# Patient Record
Sex: FEMALE | Race: WHITE | HISPANIC OR LATINO | Employment: FULL TIME | URBAN - METROPOLITAN AREA
[De-identification: names, ages, dates, MRNs, and addresses within clinical notes are randomized per-mention and may not be internally consistent; named-entity substitution may affect disease eponyms.]

---

## 2024-04-18 ENCOUNTER — HOSPITAL ENCOUNTER (EMERGENCY)
Facility: HOSPITAL | Age: 24
Discharge: HOME | End: 2024-04-18
Attending: EMERGENCY MEDICINE
Payer: MEDICAID

## 2024-04-18 ENCOUNTER — APPOINTMENT (OUTPATIENT)
Dept: GENERAL RADIOLOGY | Facility: HOSPITAL | Age: 24
End: 2024-04-18
Payer: MEDICAID

## 2024-04-18 VITALS
BODY MASS INDEX: 25.9 KG/M2 | TEMPERATURE: 98 F | RESPIRATION RATE: 18 BRPM | SYSTOLIC BLOOD PRESSURE: 124 MMHG | HEART RATE: 72 BPM | DIASTOLIC BLOOD PRESSURE: 87 MMHG | OXYGEN SATURATION: 98 % | WEIGHT: 165 LBS | HEIGHT: 67 IN

## 2024-04-18 DIAGNOSIS — M25.552 PAIN OF LEFT HIP: ICD-10-CM

## 2024-04-18 DIAGNOSIS — M54.50 ACUTE LOW BACK PAIN, UNSPECIFIED BACK PAIN LATERALITY, UNSPECIFIED WHETHER SCIATICA PRESENT: Primary | ICD-10-CM

## 2024-04-18 DIAGNOSIS — S70.02XA CONTUSION OF LEFT HIP, INITIAL ENCOUNTER: ICD-10-CM

## 2024-04-18 DIAGNOSIS — S20.222A CONTUSION OF LEFT SIDE OF BACK, INITIAL ENCOUNTER: ICD-10-CM

## 2024-04-18 PROCEDURE — 99283 EMERGENCY DEPT VISIT LOW MDM: CPT

## 2024-04-18 PROCEDURE — 73502 X-RAY EXAM HIP UNI 2-3 VIEWS: CPT

## 2024-04-18 PROCEDURE — 72100 X-RAY EXAM L-S SPINE 2/3 VWS: CPT

## 2024-04-18 PROCEDURE — 25010000002 KETOROLAC TROMETHAMINE PER 15 MG: Performed by: NURSE PRACTITIONER

## 2024-04-18 PROCEDURE — 96372 THER/PROPH/DIAG INJ SC/IM: CPT

## 2024-04-18 RX ORDER — IBUPROFEN 600 MG/1
600 TABLET ORAL EVERY 6 HOURS PRN
Qty: 20 TABLET | Refills: 0 | Status: SHIPPED | OUTPATIENT
Start: 2024-04-18 | End: 2024-04-23

## 2024-04-18 RX ORDER — KETOROLAC TROMETHAMINE 30 MG/ML
30 INJECTION, SOLUTION INTRAMUSCULAR; INTRAVENOUS ONCE
Status: COMPLETED | OUTPATIENT
Start: 2024-04-18 | End: 2024-04-18

## 2024-04-18 RX ORDER — METHOCARBAMOL 750 MG/1
750 TABLET, FILM COATED ORAL 3 TIMES DAILY PRN
Qty: 15 TABLET | Refills: 0 | Status: SHIPPED | OUTPATIENT
Start: 2024-04-18 | End: 2024-04-23

## 2024-04-18 RX ORDER — HYDROCODONE BITARTRATE AND ACETAMINOPHEN 5; 325 MG/1; MG/1
1 TABLET ORAL ONCE
Status: COMPLETED | OUTPATIENT
Start: 2024-04-18 | End: 2024-04-18

## 2024-04-18 RX ADMIN — KETOROLAC TROMETHAMINE 30 MG: 30 INJECTION, SOLUTION INTRAMUSCULAR; INTRAVENOUS at 19:11

## 2024-04-18 RX ADMIN — HYDROCODONE BITARTRATE AND ACETAMINOPHEN 1 TABLET: 5; 325 TABLET ORAL at 19:11

## 2024-04-18 NOTE — Clinical Note
[facility] EMERGENCY DEPARTMENT  [address]  [facility] [address]  Phone: [phone]    Gita Vargas was seen and treated in our emergency department on 4/18/2024.  She may return to work on 04/20/2024.         Thank you for choosing [facility].    Lucía Camara, ODESSA

## 2024-04-18 NOTE — ED PROVIDER NOTES
EMERGENCY DEPARTMENT ENCOUNTER    Pt Name: Gita Vargas  MRN: [id number]  Pt :   2000  Room Number:  RW6/R6  Date of encounter:  2024  PCP: Provider, No Known  ED Provider: ODESSA Squires    Historian: Patient    HPI:  Chief Complaint:  Lt hip pain    Context: Gita Vargas is a 24 y.o. female who presents to the ED c/o Lt hip pain.  Patient explains that she tripped over her acute fracture 4 days ago.  Patient landed on her left hip.  She reports that she has been experiencing pain in her left hip, low back.  She describes it as a twinge discomfort.  She complains of pain with all movement.  HPI     REVIEW OF SYSTEMS  A chief complaint appropriate review of systems was completed and is negative except as noted in the HPI.     PAST MEDICAL HISTORY  No past medical history on file.    PAST SURGICAL HISTORY  No past surgical history on file.    FAMILY HISTORY  No family history on file.    SOCIAL HISTORY  Social History     Socioeconomic History    Marital status: Single       ALLERGIES  Patient has no known allergies.    PHYSICAL EXAM  Physical Exam  Vitals and nursing note reviewed.   Constitutional:       General: She is not in acute distress.     Appearance: Normal appearance. She is not ill-appearing.   HENT:      Head: Normocephalic and atraumatic.      Nose: Nose normal.   Eyes:      Extraocular Movements: Extraocular movements intact.      Pupils: Pupils are equal, round, and reactive to light.   Cardiovascular:      Rate and Rhythm: Normal rate and regular rhythm.      Pulses: Normal pulses.      Heart sounds: Normal heart sounds.   Pulmonary:      Effort: Pulmonary effort is normal.      Breath sounds: Normal breath sounds.   Musculoskeletal:      Cervical back: Normal range of motion and neck supple. No tenderness. Normal range of motion.      Thoracic back: Normal. No tenderness. Normal range of motion.      Lumbar back: Tenderness (Lt hip pain, lower back pain.) present.      Left  hip: Tenderness present. No deformity. Normal range of motion. Normal strength.   Skin:     General: Skin is warm and dry.   Neurological:      General: No focal deficit present.      Mental Status: She is alert and oriented to person, place, and time.   Psychiatric:         Mood and Affect: Mood normal.         Behavior: Behavior normal.           LAB RESULTS  No results found for this or any previous visit.    If labs were ordered, I independently reviewed the results and considered them in treating the patient.    RADIOLOGY  XR Hip With or Without Pelvis 2 - 3 View Left   Final Result   Normal lumbar spine and pelvis.         Electronically Signed: Angel Read MD     4/18/2024 7:33 PM EDT     Workstation ID: [id number]      XR Spine Lumbar 2 or 3 View   Final Result   Normal lumbar spine and pelvis.         Electronically Signed: Angel Read MD     4/18/2024 7:33 PM EDT     Workstation ID: [id number]        [x] Radiologist's Report Reviewed:  I ordered and independently interpreted the above noted radiographic studies.  See radiologist's dictation for official interpretation.      PROCEDURES    Procedures    No orders to display       MEDICATIONS GIVEN IN ER    Medications   ketorolac (TORADOL) injection 30 mg (30 mg Intramuscular Given 4/18/24 1911)   HYDROcodone-acetaminophen (NORCO) 5-325 MG per tablet 1 tablet (1 tablet Oral Given 4/18/24 1911)       MEDICAL DECISION MAKING, PROGRESS, and CONSULTS   Medical Decision Making  Gita Vargas is a 24 y.o. female who presents to the ED c/o Lt hip pain.  Patient explains that she tripped over her acute fracture 4 days ago.  Patient landed on her left hip.  She reports that she has been experiencing pain in her left hip, low back.  She describes it as a twinge discomfort.  She complains of pain with all movement.      Amount and/or Complexity of Data Reviewed  Radiology: ordered.    Risk  Prescription drug management.        All labs have been independently reviewed by  me.  All radiology studies have been interpreted by me and the radiologist dictating the report.  All EKG's have been independently interpreted by me as well as and overseeing attending physician.    [] Discussed with radiology regarding test interpretation:    Discussion below represents my analysis of pertinent findings related to patient's condition, differential diagnosis, treatment plan and final disposition.    Differential diagnosis:  The differential diagnosis associated with the patient's presentation includes: fracture, contusion, DJD, DDD, sciatica.     Additional sources  Discussed/ obtained information from independent historians:   [] Spouse  [] Parent  [] Family member  [] Friend  [] EMS   [] Other:  External (non-ED) record review:   [] Inpatient record:   [] Office record:   [] Outpatient record:   [] Prior Outpatient labs:   [] Prior Outpatient radiology:   [] Primary Care record:   [] Outside ED record:   [] Other:   Patient's care impacted by:   [] Diabetes  [] Hypertension  [] Hyperlipidemia  [] Hypothyroidism   [] Coronary Artery Disease   [] COPD   [] Cancer   [] Obesity  [] GERD   [] Tobacco Abuse   [] Substance Abuse    [] Anxiety   [] Depression   [] Other:   Care significantly affected by Social Determinants of Health (housing and economic circumstances, unemployment)    [] Yes     [x] No   If yes, Patient's care significantly limited by  Social Determinants of Health including:   [] Inadequate housing   [] Low income   [] Alcoholism and drug addiction in family   [] Problems related to primary support group   [] Unemployment   [] Problems related to employment   [] Other Social Determinants of Health:     Shared decision making: Shared decision making with patient imaging is negative for acute findings.  We will discharge the patient home.  Patient to rest, ice.  Patient to take ibuprofen and muscle relaxant as ordered.  Patient to follow-up with PCP as needed.  Patient agrees and  verbalized understanding.    Orders placed during this visit:  Orders Placed This Encounter   Procedures    XR Spine Lumbar 2 or 3 View    XR Hip With or Without Pelvis 2 - 3 View Left       I considered prescription management  with:   [] Pain medication  [] Antiviral  [] Antibiotic   [] Other:   Rationale:  Additional orders considered but not ordered:  The following testing was considered but ultimately not selected after discussion with patient/family:    ED Course:              DIAGNOSIS  Final diagnoses:   Acute low back pain, unspecified back pain laterality, unspecified whether sciatica present   Pain of left hip   Contusion of left side of back, initial encounter   Contusion of left hip, initial encounter       DISPOSITION    DISCHARGE    Patient discharged in stable condition.    Reviewed implications of results, diagnosis, meds, responsibility to follow up, warning signs and symptoms of possible worsening, potential complications and reasons to return to ER.    Patient/Family voiced understanding of above instructions.    Discussed plan for discharge, as there is no emergent indication for admission.  Pt/family is agreeable and understands need for follow up and possible repeat testing.  Pt/family is aware that discharge does not mean that nothing is wrong but that it indicates no emergency is currently present that requires admission and they must continue care with follow-up as given below or with a physician of their choice.     FOLLOW-UP  PATIENT CONNECTION - [facility] [address]  [phone]             Medication List        New Prescriptions      ibuprofen 600 MG tablet  Commonly known as: ADVIL,MOTRIN  Take 1 tablet by mouth Every 6 (Six) Hours As Needed for Moderate Pain for up to 5 days.     methocarbamol 750 MG tablet  Commonly known as: ROBAXIN  Take 1 tablet by mouth 3 (Three) Times a Day As Needed for Muscle Spasms for up to 5 days.               Where to Get Your Medications         These medications were sent to [facility][id number] - [place], [place] - [address] AT [facility] [address] - [phone]  - [phone]  [address], [facility] [address]      Phone: [phone]   ibuprofen 600 MG tablet  methocarbamol 750 MG tablet          ED Disposition       ED Disposition   Discharge    Condition   Stable    Comment   --               Please note that portions of this document were completed with voice recognition software.       Lucía Camara, APRN  04/18/24 1944

## 2024-04-18 NOTE — DISCHARGE INSTRUCTIONS
Imaging is negative for acute findings.  There are no fractures or dislocations.    Your pain is more than likely related to muscle spasm, deep bruising.    Take anti-inflammatory and pain medication as ordered.

## 2025-02-11 ENCOUNTER — HOSPITAL ENCOUNTER (EMERGENCY)
Facility: HOSPITAL | Age: 25
Discharge: HOME | End: 2025-02-12
Attending: EMERGENCY MEDICINE
Payer: MEDICAID

## 2025-02-11 DIAGNOSIS — K52.9 GASTROENTERITIS: Primary | ICD-10-CM

## 2025-02-11 DIAGNOSIS — R11.2 NAUSEA AND VOMITING IN ADULT: ICD-10-CM

## 2025-02-11 DIAGNOSIS — D72.829 LEUKOCYTOSIS, UNSPECIFIED TYPE: ICD-10-CM

## 2025-02-11 DIAGNOSIS — R10.13 EPIGASTRIC ABDOMINAL PAIN: ICD-10-CM

## 2025-02-11 DIAGNOSIS — E86.0 DEHYDRATION: ICD-10-CM

## 2025-02-11 LAB
B-HCG UR QL: NEGATIVE
BACTERIA UR QL AUTO: ABNORMAL /HPF
BASOPHILS # BLD AUTO: 0.04 10*3/MM3 (ref 0–0.2)
BASOPHILS NFR BLD AUTO: 0.2 % (ref 0–1.5)
BILIRUB UR QL STRIP: NEGATIVE
CLARITY UR: ABNORMAL
COLOR UR: YELLOW
DEPRECATED RDW RBC AUTO: 36.1 FL (ref 37–54)
EOSINOPHIL # BLD AUTO: 0.13 10*3/MM3 (ref 0–0.4)
EOSINOPHIL NFR BLD AUTO: 0.8 % (ref 0.3–6.2)
ERYTHROCYTE [DISTWIDTH] IN BLOOD BY AUTOMATED COUNT: 12.2 % (ref 12.3–15.4)
EXPIRATION DATE: NORMAL
GLUCOSE UR STRIP-MCNC: NEGATIVE MG/DL
HCT VFR BLD AUTO: 37.5 % (ref 34–46.6)
HGB BLD-MCNC: 13 G/DL (ref 12–15.9)
HGB UR QL STRIP.AUTO: ABNORMAL
HOLD SPECIMEN: NORMAL
HYALINE CASTS UR QL AUTO: ABNORMAL /LPF
IMM GRANULOCYTES # BLD AUTO: 0.05 10*3/MM3 (ref 0–0.05)
IMM GRANULOCYTES NFR BLD AUTO: 0.3 % (ref 0–0.5)
INTERNAL NEGATIVE CONTROL: NORMAL
INTERNAL POSITIVE CONTROL: NORMAL
KETONES UR QL STRIP: ABNORMAL
LEUKOCYTE ESTERASE UR QL STRIP.AUTO: NEGATIVE
LYMPHOCYTES # BLD AUTO: 1.53 10*3/MM3 (ref 0.7–3.1)
LYMPHOCYTES NFR BLD AUTO: 9.3 % (ref 19.6–45.3)
Lab: NORMAL
MCH RBC QN AUTO: 28.4 PG (ref 26.6–33)
MCHC RBC AUTO-ENTMCNC: 34.7 G/DL (ref 31.5–35.7)
MCV RBC AUTO: 82.1 FL (ref 79–97)
MONOCYTES # BLD AUTO: 1.04 10*3/MM3 (ref 0.1–0.9)
MONOCYTES NFR BLD AUTO: 6.3 % (ref 5–12)
NEUTROPHILS NFR BLD AUTO: 13.61 10*3/MM3 (ref 1.7–7)
NEUTROPHILS NFR BLD AUTO: 83.1 % (ref 42.7–76)
NITRITE UR QL STRIP: NEGATIVE
NRBC BLD AUTO-RTO: 0 /100 WBC (ref 0–0.2)
PH UR STRIP.AUTO: 5.5 [PH] (ref 5–8)
PLATELET # BLD AUTO: 264 10*3/MM3 (ref 140–450)
PMV BLD AUTO: 11.6 FL (ref 6–12)
PROT UR QL STRIP: ABNORMAL
RBC # BLD AUTO: 4.57 10*6/MM3 (ref 3.77–5.28)
RBC # UR STRIP: ABNORMAL /HPF
REF LAB TEST METHOD: ABNORMAL
SP GR UR STRIP: 1.03 (ref 1–1.03)
SQUAMOUS #/AREA URNS HPF: ABNORMAL /HPF
UROBILINOGEN UR QL STRIP: ABNORMAL
WBC # UR STRIP: ABNORMAL /HPF
WBC NRBC COR # BLD AUTO: 16.4 10*3/MM3 (ref 3.4–10.8)
WHOLE BLOOD HOLD COAG: NORMAL
WHOLE BLOOD HOLD SPECIMEN: NORMAL

## 2025-02-11 PROCEDURE — 96374 THER/PROPH/DIAG INJ IV PUSH: CPT

## 2025-02-11 PROCEDURE — 99284 EMERGENCY DEPT VISIT MOD MDM: CPT

## 2025-02-11 PROCEDURE — 81025 URINE PREGNANCY TEST: CPT | Performed by: EMERGENCY MEDICINE

## 2025-02-11 PROCEDURE — 80053 COMPREHEN METABOLIC PANEL: CPT | Performed by: EMERGENCY MEDICINE

## 2025-02-11 PROCEDURE — 81001 URINALYSIS AUTO W/SCOPE: CPT | Performed by: EMERGENCY MEDICINE

## 2025-02-11 PROCEDURE — 84702 CHORIONIC GONADOTROPIN TEST: CPT | Performed by: EMERGENCY MEDICINE

## 2025-02-11 PROCEDURE — 85025 COMPLETE CBC W/AUTO DIFF WBC: CPT | Performed by: EMERGENCY MEDICINE

## 2025-02-11 PROCEDURE — 25810000003 SODIUM CHLORIDE 0.9 % SOLUTION: Performed by: EMERGENCY MEDICINE

## 2025-02-11 PROCEDURE — 25010000002 ONDANSETRON PER 1 MG: Performed by: EMERGENCY MEDICINE

## 2025-02-11 PROCEDURE — 83690 ASSAY OF LIPASE: CPT | Performed by: EMERGENCY MEDICINE

## 2025-02-11 RX ORDER — SODIUM CHLORIDE 9 MG/ML
10 INJECTION, SOLUTION INTRAMUSCULAR; INTRAVENOUS; SUBCUTANEOUS AS NEEDED
Status: DISCONTINUED | OUTPATIENT
Start: 2025-02-11 | End: 2025-02-12 | Stop reason: HOSPADM

## 2025-02-11 RX ORDER — ONDANSETRON 2 MG/ML
4 INJECTION INTRAMUSCULAR; INTRAVENOUS ONCE
Status: COMPLETED | OUTPATIENT
Start: 2025-02-11 | End: 2025-02-11

## 2025-02-11 RX ADMIN — SODIUM CHLORIDE 1000 ML: 9 INJECTION, SOLUTION INTRAVENOUS at 23:53

## 2025-02-11 RX ADMIN — ONDANSETRON 4 MG: 2 INJECTION INTRAMUSCULAR; INTRAVENOUS at 23:53

## 2025-02-11 NOTE — Clinical Note
[facility] EMERGENCY DEPARTMENT  [address]  [facility] [address]  Phone: [phone]    Gita Vargas was seen and treated in our emergency department on 2/11/2025.  She may return to work on 02/13/2025.         Thank you for choosing [facility].    Ryan Ventura MD

## 2025-02-12 ENCOUNTER — APPOINTMENT (OUTPATIENT)
Dept: ULTRASOUND IMAGING | Facility: HOSPITAL | Age: 25
End: 2025-02-12
Payer: MEDICAID

## 2025-02-12 VITALS
WEIGHT: 182.43 LBS | OXYGEN SATURATION: 98 % | RESPIRATION RATE: 16 BRPM | DIASTOLIC BLOOD PRESSURE: 66 MMHG | HEART RATE: 55 BPM | TEMPERATURE: 97.6 F | SYSTOLIC BLOOD PRESSURE: 111 MMHG | BODY MASS INDEX: 28.63 KG/M2 | HEIGHT: 67 IN

## 2025-02-12 LAB
ALBUMIN SERPL-MCNC: 4.5 G/DL (ref 3.5–5.2)
ALBUMIN/GLOB SERPL: 1.5 G/DL
ALP SERPL-CCNC: 77 U/L (ref 39–117)
ALT SERPL W P-5'-P-CCNC: 12 U/L (ref 1–33)
ANION GAP SERPL CALCULATED.3IONS-SCNC: 12 MMOL/L (ref 5–15)
AST SERPL-CCNC: 14 U/L (ref 1–32)
BILIRUB SERPL-MCNC: 0.2 MG/DL (ref 0–1.2)
BUN SERPL-MCNC: 13 MG/DL (ref 6–20)
BUN/CREAT SERPL: 18.3 (ref 7–25)
CALCIUM SPEC-SCNC: 9.3 MG/DL (ref 8.6–10.5)
CHLORIDE SERPL-SCNC: 107 MMOL/L (ref 98–107)
CO2 SERPL-SCNC: 22 MMOL/L (ref 22–29)
CREAT SERPL-MCNC: 0.71 MG/DL (ref 0.57–1)
EGFRCR SERPLBLD CKD-EPI 2021: 123.5 ML/MIN/1.73
GLOBULIN UR ELPH-MCNC: 3.1 GM/DL
GLUCOSE SERPL-MCNC: 107 MG/DL (ref 65–99)
HCG INTACT+B SERPL-ACNC: <0.1 MIU/ML
LIPASE SERPL-CCNC: 47 U/L (ref 13–60)
POTASSIUM SERPL-SCNC: 3.9 MMOL/L (ref 3.5–5.2)
PROT SERPL-MCNC: 7.6 G/DL (ref 6–8.5)
SODIUM SERPL-SCNC: 141 MMOL/L (ref 136–145)

## 2025-02-12 PROCEDURE — 76705 ECHO EXAM OF ABDOMEN: CPT

## 2025-02-12 RX ORDER — ONDANSETRON 4 MG/1
4 TABLET, ORALLY DISINTEGRATING ORAL EVERY 6 HOURS PRN
Qty: 9 TABLET | Refills: 0 | Status: SHIPPED | OUTPATIENT
Start: 2025-02-12

## 2025-02-12 NOTE — ED PROVIDER NOTES
[facility]    EMERGENCY DEPARTMENT ENCOUNTER      Pt Name: Gita Vargas  MRN: [id number]  YOB: 2000  Date of evaluation: 2/11/2025  Provider: Ryan Ventura MD    CHIEF COMPLAINT       Chief Complaint   Patient presents with    Vomiting         HISTORY OF PRESENT ILLNESS   Gita Vargas is a 24 y.o. female who presents to the emergency department for evaluation of nausea, vomiting for the past 12 hours. She has had soft stool without trino diarrhea.  She has had chills without objective fever.  She has not had any recent travel.She is having some upper abdominal pain.      REVIEW OF SYSTEMS     ROS:  A chief complaint appropriate review of systems was completed and is negative except as noted in the HPI.      PAST MEDICAL HISTORY   No past medical history on file.      SURGICAL HISTORY     No past surgical history on file.      CURRENT MEDICATIONS     No current facility-administered medications for this encounter.    Current Outpatient Medications:     ondansetron ODT (ZOFRAN-ODT) 4 MG disintegrating tablet, Place 1 tablet on the tongue Every 6 (Six) Hours As Needed for Nausea or Vomiting., Disp: 9 tablet, Rfl: 0    ALLERGIES     Patient has no known allergies.    FAMILY HISTORY     No family history on file.       SOCIAL HISTORY       Social History     Socioeconomic History    Marital status: Single         PHYSICAL EXAM    (up to 7 for level 4, 8 or more for level 5)     Vitals:    02/11/25 2330 02/12/25 0001 02/12/25 0047 02/12/25 0102   BP: 120/87  113/67 111/66   BP Location:       Patient Position:       Pulse: 78 60 59 55   Resp: 16 16 16    Temp:       TempSrc:       SpO2: 99% 97% 97% 98%   Weight:       Height:           Physical Exam  Constitutional:       General: She is not in acute distress.  HENT:      Head: Normocephalic and atraumatic.      Mouth/Throat:      Mouth: Mucous membranes are dry.   Eyes:      Conjunctiva/sclera: Conjunctivae normal.      Pupils: Pupils are equal, round, and  reactive to light.   Cardiovascular:      Rate and Rhythm: Normal rate and regular rhythm.      Pulses: Normal pulses.      Heart sounds: No murmur heard.     No gallop.   Pulmonary:      Effort: Pulmonary effort is normal. No respiratory distress.   Abdominal:      General: Abdomen is flat. There is no distension.      Tenderness: There is abdominal tenderness.      Comments: Tender in the epigastrium   Musculoskeletal:         General: No swelling or deformity. Normal range of motion.   Skin:     General: Skin is warm and dry.      Capillary Refill: Capillary refill takes 2 to 3 seconds.   Neurological:      General: No focal deficit present.      Mental Status: She is alert and oriented to person, place, and time.   Psychiatric:         Mood and Affect: Mood normal.         Behavior: Behavior normal.            DIAGNOSTIC RESULTS     EKG: All EKGs are interpreted by the Emergency Department Physician who either signs or Co-signs this chart in the absence of a cardiologist.    No orders to display         RADIOLOGY:   [x] Radiologist's Report Reviewed:  US Gallbladder   Final Result   Impression:   No acute sonographic abnormality within the right upper quadrant.                  Electronically Signed: Jailene Mcelroy MD     2/12/2025 12:53 AM EST     Workstation ID: [id number]          I ordered and independently reviewed the above noted radiographic studies.        LABS:  I independently interpreted all laboratory studies conducted during this ED visit.  The results of these studies can be seen below and my independent interpretation in the ED course      EMERGENCY DEPARTMENT COURSE and DIFFERENTIAL DIAGNOSIS/MDM:   Vitals:  AS OF 03:26 EST    BP - 111/66  HR - 55  TEMP - 97.6 °F (36.4 °C) (Oral)  O2 SATS - 98%        Discussion below represents my analysis of pertinent findings related to patient's condition, differential diagnosis, treatment plan and final disposition.      Differential diagnosis:  The differential  diagnosis associated with the patient's presentation includes: gastroenteritis, infectious colitis, inflammatory bowel disease, pancreatitis, GERD, cholecystitis      Independent interpretations (ECG/rhythm strip/X-ray/US/CT scan): See ED course      Additional sources:  Discussed/obtained information from independent historians:   [] Spouse:   [] Parent:   [] Friend:   [] EMS:   [] Other:    External record review:  Reviewed most recent previous ER visit, patient seen on 4/18/2024 for left hip pain after fall      Patient's care impacted by:   [] Diabetes   [] Hypertension   [] Coronary Artery Disease   [] Cancer   [] Other:     Care significantly affected by Social Determinants of Health (housing and economic circumstances, unemployment)    [] Yes     [x] No   If yes, Patient's care significantly limited by  Social Determinants of Health including:    [] Inadequate housing    [] Low income    [] Alcoholism and drug addiction in family    [] Problems related to primary support group    [] Unemployment    [] Problems related to employment    [] Other Social Determinants of Health:     I considered prescription management with:    [] Pain medication:   [] Antiviral:   [] Antibiotic:   [x] Other: Zofran    Additional orders considered but not ordered:  The following testing was considered but ultimately not selected: CT scan of the abdomen    ED Course:    ED Course as of 02/12/25 0326   Tue Feb 11, 2025   2333 HCG, Urine QL: Negative [KB]   Wed Feb 12, 2025   0057 Right upper quadrant ultrasound independently interpreted by myself demonstrates no acute abnormalities [KB]   0058 Laboratory workup independently interpreted by myself notable only for leukocytosis [KB]      ED Course User Index  [KB] Ryan Ventura MD         Diagnostic lab and imaging studies were conducted.  Therapeutic interventions with 1 L IV fluid bolus and 4 mg IV Zofran.    I reevaluated the patient and we discussed her test results.  She states  she is feeling markedly improved.  Repeat abdominal examination is benign.  I do not believe she requires a CT scan for diagnostic clarification at this time as the diagnosis seems consistent with a gastro enteritis.  I counseled her on home Zofran use.    Prior to discharge from the emergency department I discussed with the patient and any family members present the current diagnosis, treatment plan, recommendations regarding follow-up with a primary care doctor or specialist, general emergency room return precautions as well as return precautions specific to their diagnosis and treatment.  The patient/family indicated understanding of these instructions.  Time was allotted to answer questions at that time and throughout the ED course.         PROCEDURES:  Procedures    CRITICAL CARE TIME        CONSULTS       FINAL IMPRESSION      1. Gastroenteritis    2. Nausea and vomiting in adult    3. Leukocytosis, unspecified type    4. Epigastric abdominal pain    5. Dehydration          DISPOSITION/PLAN     ED Disposition       ED Disposition   Discharge    Condition   Stable    Comment   --                 Comment: Please note this report has been produced using speech recognition software.      Ryan Ventura MD  Attending Emergency Physician    Recent Results (from the past 24 hours)   Comprehensive Metabolic Panel    Collection Time: 02/11/25 11:22 PM    Specimen: Blood   Result Value Ref Range    Glucose 107 (H) 65 - 99 mg/dL    BUN 13 6 - 20 mg/dL    Creatinine 0.71 0.57 - 1.00 mg/dL    Sodium 141 136 - 145 mmol/L    Potassium 3.9 3.5 - 5.2 mmol/L    Chloride 107 98 - 107 mmol/L    CO2 22.0 22.0 - 29.0 mmol/L    Calcium 9.3 8.6 - 10.5 mg/dL    Total Protein 7.6 6.0 - 8.5 g/dL    Albumin 4.5 3.5 - 5.2 g/dL    ALT (SGPT) 12 1 - 33 U/L    AST (SGOT) 14 1 - 32 U/L    Alkaline Phosphatase 77 39 - 117 U/L    Total Bilirubin 0.2 0.0 - 1.2 mg/dL    Globulin 3.1 gm/dL    A/G Ratio 1.5 g/dL    BUN/Creatinine Ratio 18.3 7.0 -  25.0    Anion Gap 12.0 5.0 - 15.0 mmol/L    eGFR 123.5 >60.0 mL/min/1.73   Lipase    Collection Time: 02/11/25 11:22 PM    Specimen: Blood   Result Value Ref Range    Lipase 47 13 - 60 U/L   hCG, Quantitative, Pregnancy    Collection Time: 02/11/25 11:22 PM    Specimen: Blood   Result Value Ref Range    HCG Quantitative <0.10 mIU/mL   Green Top (Gel)    Collection Time: 02/11/25 11:22 PM   Result Value Ref Range    Extra Tube Hold for add-ons.    Lavender Top    Collection Time: 02/11/25 11:22 PM   Result Value Ref Range    Extra Tube hold for add-on    Gold Top - SST    Collection Time: 02/11/25 11:22 PM   Result Value Ref Range    Extra Tube Hold for add-ons.    Gray Top    Collection Time: 02/11/25 11:22 PM   Result Value Ref Range    Extra Tube Hold for add-ons.    Light Blue Top    Collection Time: 02/11/25 11:22 PM   Result Value Ref Range    Extra Tube Hold for add-ons.    CBC Auto Differential    Collection Time: 02/11/25 11:22 PM    Specimen: Blood   Result Value Ref Range    WBC 16.40 (H) 3.40 - 10.80 10*3/mm3    RBC 4.57 3.77 - 5.28 10*6/mm3    Hemoglobin 13.0 12.0 - 15.9 g/dL    Hematocrit 37.5 34.0 - 46.6 %    MCV 82.1 79.0 - 97.0 fL    MCH 28.4 26.6 - 33.0 pg    MCHC 34.7 31.5 - 35.7 g/dL    RDW 12.2 (L) 12.3 - 15.4 %    RDW-SD 36.1 (L) 37.0 - 54.0 fl    MPV 11.6 6.0 - 12.0 fL    Platelets 264 140 - 450 10*3/mm3    Neutrophil % 83.1 (H) 42.7 - 76.0 %    Lymphocyte % 9.3 (L) 19.6 - 45.3 %    Monocyte % 6.3 5.0 - 12.0 %    Eosinophil % 0.8 0.3 - 6.2 %    Basophil % 0.2 0.0 - 1.5 %    Immature Grans % 0.3 0.0 - 0.5 %    Neutrophils, Absolute 13.61 (H) 1.70 - 7.00 10*3/mm3    Lymphocytes, Absolute 1.53 0.70 - 3.10 10*3/mm3    Monocytes, Absolute 1.04 (H) 0.10 - 0.90 10*3/mm3    Eosinophils, Absolute 0.13 0.00 - 0.40 10*3/mm3    Basophils, Absolute 0.04 0.00 - 0.20 10*3/mm3    Immature Grans, Absolute 0.05 0.00 - 0.05 10*3/mm3    nRBC 0.0 0.0 - 0.2 /100 WBC   Urinalysis With Microscopic If Indicated (No  Culture) - Urine, Clean Catch    Collection Time: 02/11/25 11:25 PM    Specimen: Urine, Clean Catch   Result Value Ref Range    Color, UA Yellow Yellow, Straw    Appearance, UA Cloudy (A) Clear    pH, UA 5.5 5.0 - 8.0    Specific Gravity, UA 1.035 (H) 1.001 - 1.030    Glucose, UA Negative Negative    Ketones, UA Trace (A) Negative    Bilirubin, UA Negative Negative    Blood, UA Trace (A) Negative    Protein, UA Trace (A) Negative    Leuk Esterase, UA Negative Negative    Nitrite, UA Negative Negative    Urobilinogen, UA 0.2 E.U./dL 0.2 - 1.0 E.U./dL   Urinalysis, Microscopic Only - Urine, Clean Catch    Collection Time: 02/11/25 11:25 PM    Specimen: Urine, Clean Catch   Result Value Ref Range    RBC, UA 6-10 (A) None Seen, 0-2 /HPF    WBC, UA 6-10 (A) None Seen, 0-2 /HPF    Bacteria, UA 1+ (A) None Seen, Trace /HPF    Squamous Epithelial Cells, UA 7-12 (A) None Seen, 0-2 /HPF    Hyaline Casts, UA 0-6 0 - 6 /LPF    Methodology Automated Microscopy    POC Urine Pregnancy    Collection Time: 02/11/25 11:28 PM    Specimen: Urine   Result Value Ref Range    HCG, Urine, QL Negative Negative    Lot Number [id number]     Internal Positive Control Passed Positive, Passed    Internal Negative Control Passed Negative, Passed    Expiration Date 2026-05-27      Note: In addition to lab results from this visit, the labs listed above may include labs taken at another facility or during a different encounter within the last 24 hours. Please correlate lab times with ED admission and discharge times for further clarification of the services performed during this visit.                 Ryan Ventura MD  02/12/25 0326

## 2025-02-12 NOTE — DISCHARGE INSTRUCTIONS
Use prescribed Zofran as needed for nausea and vomiting.  Drink plenty of fluids to stay well-hydrated, I recommend water or Pedialyte.  Follow-up with your primary doctor as needed.  You can expect that you will probably have some diarrhea over the next few days.